# Patient Record
Sex: MALE | Race: WHITE | Employment: FULL TIME | URBAN - METROPOLITAN AREA
[De-identification: names, ages, dates, MRNs, and addresses within clinical notes are randomized per-mention and may not be internally consistent; named-entity substitution may affect disease eponyms.]

---

## 2019-03-24 ENCOUNTER — HOSPITAL ENCOUNTER (EMERGENCY)
Age: 29
Discharge: HOME | End: 2019-03-24
Attending: EMERGENCY MEDICINE
Payer: COMMERCIAL

## 2019-03-24 ENCOUNTER — APPOINTMENT (OUTPATIENT)
Dept: GENERAL RADIOLOGY | Age: 29
End: 2019-03-24
Payer: COMMERCIAL

## 2019-03-24 VITALS
OXYGEN SATURATION: 99 % | HEIGHT: 72 IN | TEMPERATURE: 97.2 F | WEIGHT: 265 LBS | HEART RATE: 88 BPM | SYSTOLIC BLOOD PRESSURE: 128 MMHG | RESPIRATION RATE: 12 BRPM | BODY MASS INDEX: 35.89 KG/M2 | DIASTOLIC BLOOD PRESSURE: 80 MMHG

## 2019-03-24 DIAGNOSIS — S61.210A LACERATION OF RIGHT INDEX FINGER WITHOUT FOREIGN BODY WITHOUT DAMAGE TO NAIL, INITIAL ENCOUNTER: Primary | ICD-10-CM

## 2019-03-24 PROCEDURE — 4500000023 HC ED LEVEL 3 PROCEDURE

## 2019-03-24 PROCEDURE — 90715 TDAP VACCINE 7 YRS/> IM: CPT | Performed by: NURSE PRACTITIONER

## 2019-03-24 PROCEDURE — 99283 EMERGENCY DEPT VISIT LOW MDM: CPT

## 2019-03-24 PROCEDURE — 6360000002 HC RX W HCPCS: Performed by: NURSE PRACTITIONER

## 2019-03-24 PROCEDURE — 90471 IMMUNIZATION ADMIN: CPT | Performed by: NURSE PRACTITIONER

## 2019-03-24 PROCEDURE — 73140 X-RAY EXAM OF FINGER(S): CPT

## 2019-03-24 RX ADMIN — TETANUS TOXOID, REDUCED DIPHTHERIA TOXOID AND ACELLULAR PERTUSSIS VACCINE, ADSORBED 0.5 ML: 5; 2.5; 8; 8; 2.5 SUSPENSION INTRAMUSCULAR at 08:55

## 2019-03-24 ASSESSMENT — PAIN DESCRIPTION - ORIENTATION: ORIENTATION: RIGHT

## 2019-03-24 ASSESSMENT — PAIN SCALES - GENERAL: PAINLEVEL_OUTOF10: 5

## 2019-03-24 ASSESSMENT — PAIN DESCRIPTION - PAIN TYPE: TYPE: ACUTE PAIN

## 2019-03-24 ASSESSMENT — PAIN DESCRIPTION - DESCRIPTORS: DESCRIPTORS: ACHING

## 2019-03-24 ASSESSMENT — PAIN DESCRIPTION - FREQUENCY: FREQUENCY: CONTINUOUS

## 2019-03-24 ASSESSMENT — PAIN DESCRIPTION - LOCATION: LOCATION: FINGER (COMMENT WHICH ONE)

## 2019-03-24 ASSESSMENT — PAIN DESCRIPTION - ONSET: ONSET: ON-GOING

## 2019-03-24 ASSESSMENT — PAIN DESCRIPTION - PROGRESSION: CLINICAL_PROGRESSION: NOT CHANGED

## 2022-01-01 ENCOUNTER — HOSPITAL ENCOUNTER (EMERGENCY)
Age: 32
End: 2022-04-21
Attending: STUDENT IN AN ORGANIZED HEALTH CARE EDUCATION/TRAINING PROGRAM
Payer: COMMERCIAL

## 2022-01-01 VITALS — RESPIRATION RATE: 9 BRPM | HEART RATE: 131 BPM

## 2022-01-01 DIAGNOSIS — I46.9 CARDIAC ARREST (HCC): Primary | ICD-10-CM

## 2022-01-01 LAB
A/G RATIO: 1.6 (ref 1.1–2.2)
ALBUMIN SERPL-MCNC: 3.9 G/DL (ref 3.4–5)
ALP BLD-CCNC: 76 U/L (ref 40–129)
ALT SERPL-CCNC: 82 U/L (ref 10–40)
ANION GAP SERPL CALCULATED.3IONS-SCNC: 22 MMOL/L (ref 3–16)
AST SERPL-CCNC: 74 U/L (ref 15–37)
ATYPICAL LYMPHOCYTE RELATIVE PERCENT: 3 % (ref 0–6)
BANDED NEUTROPHILS RELATIVE PERCENT: 4 % (ref 0–7)
BASOPHILS ABSOLUTE: 0.1 K/UL (ref 0–0.2)
BASOPHILS RELATIVE PERCENT: 1 %
BILIRUB SERPL-MCNC: <0.2 MG/DL (ref 0–1)
BUN BLDV-MCNC: 15 MG/DL (ref 7–20)
CALCIUM SERPL-MCNC: 8.7 MG/DL (ref 8.3–10.6)
CHLORIDE BLD-SCNC: 100 MMOL/L (ref 99–110)
CO2: 17 MMOL/L (ref 21–32)
CREAT SERPL-MCNC: 1.5 MG/DL (ref 0.9–1.3)
EOSINOPHILS ABSOLUTE: 0 K/UL (ref 0–0.6)
EOSINOPHILS RELATIVE PERCENT: 0 %
GFR AFRICAN AMERICAN: >60
GFR NON-AFRICAN AMERICAN: 54
GLUCOSE BLD-MCNC: 291 MG/DL (ref 70–99)
GLUCOSE BLD-MCNC: 310 MG/DL (ref 70–99)
HCT VFR BLD CALC: 45.9 % (ref 40.5–52.5)
HEMOGLOBIN: 14 G/DL (ref 13.5–17.5)
LYMPHOCYTES ABSOLUTE: 6.8 K/UL (ref 1–5.1)
LYMPHOCYTES RELATIVE PERCENT: 49 %
MAGNESIUM: 3 MG/DL (ref 1.8–2.4)
MCH RBC QN AUTO: 27.8 PG (ref 26–34)
MCHC RBC AUTO-ENTMCNC: 30.5 G/DL (ref 31–36)
MCV RBC AUTO: 91 FL (ref 80–100)
METAMYELOCYTES RELATIVE PERCENT: 1 %
MONOCYTES ABSOLUTE: 0.4 K/UL (ref 0–1.3)
MONOCYTES RELATIVE PERCENT: 3 %
NEUTROPHILS ABSOLUTE: 5.7 K/UL (ref 1.7–7.7)
NEUTROPHILS RELATIVE PERCENT: 39 %
PDW BLD-RTO: 15.3 % (ref 12.4–15.4)
PERFORMED ON: ABNORMAL
PLATELET # BLD: 197 K/UL (ref 135–450)
PLATELET SLIDE REVIEW: ADEQUATE
PMV BLD AUTO: 8.8 FL (ref 5–10.5)
POTASSIUM SERPL-SCNC: 5.2 MMOL/L (ref 3.5–5.1)
RBC # BLD: 5.05 M/UL (ref 4.2–5.9)
RBC # BLD: NORMAL 10*6/UL
SLIDE REVIEW: ABNORMAL
SODIUM BLD-SCNC: 139 MMOL/L (ref 136–145)
TOTAL PROTEIN: 6.3 G/DL (ref 6.4–8.2)
TROPONIN: <0.01 NG/ML
WBC # BLD: 13 K/UL (ref 4–11)

## 2022-01-01 PROCEDURE — 99285 EMERGENCY DEPT VISIT HI MDM: CPT

## 2022-01-01 PROCEDURE — 85025 COMPLETE CBC W/AUTO DIFF WBC: CPT

## 2022-01-01 PROCEDURE — 96376 TX/PRO/DX INJ SAME DRUG ADON: CPT

## 2022-01-01 PROCEDURE — 2500000003 HC RX 250 WO HCPCS

## 2022-01-01 PROCEDURE — 2500000003 HC RX 250 WO HCPCS: Performed by: STUDENT IN AN ORGANIZED HEALTH CARE EDUCATION/TRAINING PROGRAM

## 2022-01-01 PROCEDURE — 96374 THER/PROPH/DIAG INJ IV PUSH: CPT

## 2022-01-01 PROCEDURE — 31500 INSERT EMERGENCY AIRWAY: CPT

## 2022-01-01 PROCEDURE — 92950 HEART/LUNG RESUSCITATION CPR: CPT

## 2022-01-01 PROCEDURE — 96375 TX/PRO/DX INJ NEW DRUG ADDON: CPT

## 2022-01-01 PROCEDURE — 36415 COLL VENOUS BLD VENIPUNCTURE: CPT

## 2022-01-01 PROCEDURE — 80053 COMPREHEN METABOLIC PANEL: CPT

## 2022-01-01 PROCEDURE — 84484 ASSAY OF TROPONIN QUANT: CPT

## 2022-01-01 PROCEDURE — 6360000002 HC RX W HCPCS: Performed by: STUDENT IN AN ORGANIZED HEALTH CARE EDUCATION/TRAINING PROGRAM

## 2022-01-01 PROCEDURE — 6360000002 HC RX W HCPCS

## 2022-01-01 PROCEDURE — 2580000003 HC RX 258

## 2022-01-01 PROCEDURE — 83735 ASSAY OF MAGNESIUM: CPT

## 2022-01-01 RX ORDER — MAGNESIUM SULFATE HEPTAHYDRATE 500 MG/ML
INJECTION, SOLUTION INTRAMUSCULAR; INTRAVENOUS DAILY PRN
Status: COMPLETED | OUTPATIENT
Start: 2022-01-01 | End: 2022-01-01

## 2022-01-01 RX ORDER — NALOXONE HYDROCHLORIDE 0.4 MG/ML
INJECTION, SOLUTION INTRAMUSCULAR; INTRAVENOUS; SUBCUTANEOUS DAILY PRN
Status: COMPLETED | OUTPATIENT
Start: 2022-01-01 | End: 2022-01-01

## 2022-01-01 RX ORDER — EPINEPHRINE 0.1 MG/ML
SYRINGE (ML) INJECTION DAILY PRN
Status: COMPLETED | OUTPATIENT
Start: 2022-01-01 | End: 2022-01-01

## 2022-01-01 RX ORDER — CALCIUM CHLORIDE 100 MG/ML
INJECTION INTRAVENOUS; INTRAVENTRICULAR DAILY PRN
Status: COMPLETED | OUTPATIENT
Start: 2022-01-01 | End: 2022-01-01

## 2022-01-01 RX ORDER — DEXTROSE MONOHYDRATE 100 MG/ML
INJECTION, SOLUTION INTRAVENOUS
Status: DISCONTINUED
Start: 2022-01-01 | End: 2022-01-01 | Stop reason: HOSPADM

## 2022-01-01 RX ADMIN — EPINEPHRINE 1 MG: 0.1 INJECTION, SOLUTION ENDOTRACHEAL; INTRACARDIAC; INTRAVENOUS at 23:45

## 2022-01-01 RX ADMIN — CALCIUM CHLORIDE 1000 MG: 100 INJECTION, SOLUTION INTRAVENOUS; INTRAVENTRICULAR at 23:41

## 2022-01-01 RX ADMIN — EPINEPHRINE 1 MG: 0.1 INJECTION, SOLUTION ENDOTRACHEAL; INTRACARDIAC; INTRAVENOUS at 23:51

## 2022-01-01 RX ADMIN — SODIUM BICARBONATE 50 MEQ: 84 INJECTION, SOLUTION INTRAVENOUS at 23:52

## 2022-01-01 RX ADMIN — MAGNESIUM SULFATE HEPTAHYDRATE 2000 MG: 500 INJECTION, SOLUTION INTRAMUSCULAR; INTRAVENOUS at 23:58

## 2022-01-01 RX ADMIN — EPINEPHRINE 1 MG: 0.1 INJECTION, SOLUTION ENDOTRACHEAL; INTRACARDIAC; INTRAVENOUS at 23:43

## 2022-01-01 RX ADMIN — SODIUM BICARBONATE 50 MEQ: 84 INJECTION, SOLUTION INTRAVENOUS at 23:42

## 2022-01-01 RX ADMIN — EPINEPHRINE 1 MG: 0.1 INJECTION, SOLUTION ENDOTRACHEAL; INTRACARDIAC; INTRAVENOUS at 23:48

## 2022-01-01 RX ADMIN — NALOXONE HYDROCHLORIDE 2 MG: 0.4 INJECTION, SOLUTION INTRAMUSCULAR; INTRAVENOUS; SUBCUTANEOUS at 23:40

## 2022-01-01 RX ADMIN — EPINEPHRINE 1 MG: 0.1 INJECTION, SOLUTION ENDOTRACHEAL; INTRACARDIAC; INTRAVENOUS at 00:01

## 2022-01-01 RX ADMIN — CALCIUM CHLORIDE 1000 MG: 100 INJECTION, SOLUTION INTRAVENOUS; INTRAVENTRICULAR at 00:00

## 2022-01-01 RX ADMIN — NALOXONE HYDROCHLORIDE 2 MG: 0.4 INJECTION, SOLUTION INTRAMUSCULAR; INTRAVENOUS; SUBCUTANEOUS at 23:46

## 2022-04-21 NOTE — ED NOTES
0040-Patient's family counseled by Dr. Dustin Rocha. Patient's mom, dad and step dad at bedside with patient. Family informed that they could not touch the patient. Family understanding of the information. Madan Wilcox at bedside to talk to the patient's family.       Joanie Marroquin RN  04/21/22 5495

## 2022-04-21 NOTE — ED PROVIDER NOTES
[address] ED      CHIEF COMPLAINT  Cardiac arrest       HISTORY OF PRESENT ILLNESS  Lenka Champagne is a 27 y.o. male with a past medical history of anxiety and depression, who presents to the ED as a cardiac arrest.  History was limited at the time of presentation. Due to cardiac arrest, patient is not able to provide any history. EMS had called in initially to determine if we wanted the patient to be brought to the emergency department. Given that patient is only 27years old, I did recommend patient be brought in for further evaluation. EMS was called out at approximately 1045. They were told that patient had last been seen approximately 15 minutes before he was found unresponsive in the bathroom. .  No bystander CPR had been completed. On EMS arrival, patient was found to be in asystole and remained in asystole. They did feel that he had maybe been down for longer than 15 minutes based off his appearance. Patient arrives with i gel and IO in place. They report they gave 5 rounds of epinephrine in the field. No Narcan or other medications have been given. There was no admission of drug use, the patient had reportedly been drinking all day. After code had finished, further information had been overheard by nursing. Per family, patient had potentially struggled with drug abuse in the past.  Patient reportedly had collapsed in the kitchen and friends moved into the bathroom. It did not appear that patient had been complaining of anything earlier in the day. Patient was on Hydroxycut as a weight loss tool. Old records reviewed: No pertinent information noted. No other complaints, modifying factors or associated symptoms. I have reviewed the following from the nursing documentation. Past Medical History:   Diagnosis Date    Anxiety     Depression      No past surgical history on file. No family history on file.   Social History     Socioeconomic History    Marital status: Single     Spouse name: Not on file    Number of children: Not on file    Years of education: Not on file    Highest education level: Not on file   Occupational History    Not on file   Tobacco Use    Smoking status: Current Every Day Smoker     Packs/day: 0.25     Years: 5.00     Pack years: 1.25     Types: Cigarettes     Last attempt to quit: 2012     Years since quittin.8    Smokeless tobacco: Never Used   Substance and Sexual Activity    Alcohol use: Yes     Comment: weekends    Drug use: No    Sexual activity: Yes     Partners: Female   Other Topics Concern    Not on file   Social History Narrative    Not on file     Social Determinants of Health     Financial Resource Strain:     Difficulty of Paying Living Expenses: Not on file   Food Insecurity:     Worried About Running Out of Food in the Last Year: Not on file    Kay of Food in the Last Year: Not on file   Transportation Needs:     Lack of Transportation (Medical): Not on file    Lack of Transportation (Non-Medical):  Not on file   Physical Activity:     Days of Exercise per Week: Not on file    Minutes of Exercise per Session: Not on file   Stress:     Feeling of Stress : Not on file   Social Connections:     Frequency of Communication with Friends and Family: Not on file    Frequency of Social Gatherings with Friends and Family: Not on file    Attends Yazdanism Services: Not on file    Active Member of [address] [place] [address] or Organizations: Not on file    Attends Club or Organization Meetings: Not on file    Marital Status: Not on file   Intimate Partner Violence:     Fear of Current or Ex-Partner: Not on file    Emotionally Abused: Not on file    Physically Abused: Not on file    Sexually Abused: Not on file   Housing Stability:     Unable to Pay for Housing in the Last Year: Not on file    Number of [place] in the Last Year: Not on file    Unstable Housing in the Last Year: Not on file     No current facility-administered medications for this encounter. Current Outpatient Medications   Medication Sig Dispense Refill    naproxen (NAPROSYN) 500 MG tablet Take 1 tablet by mouth 2 times daily pain 14 tablet 0     Allergies   Allergen Reactions    Penicillins Hives     As infant       REVIEW OF SYSTEMS  Unable to assess due to cardiac arrest    PHYSICAL EXAM  Pulse 131   Resp 9    GENERAL APPEARANCE: Unresponsive. Ill-appearing. HENT: Normocephalic. Atraumatic. Mucous membranes are dry. I gel in place. NECK: Supple. EYES: Pupils fixed and dilated. Pupils equal, nonreactive to light  HEART/CHEST: Steven device in place  LUNGS: I gel in place, breath sounds bilaterally  ABDOMEN: Obese. . Soft. Non-distended. No masses. No organomegaly. MUSCULOSKELETAL: No extremity edema. Compartments soft. No deformity. SKIN: Cool and dry. No acute rashes.    NEUROLOGICAL: No apparent response in the setting of cardiac arrest  PSYCHIATRIC: Unable to assess due to cardiac arrest    LABS  Except for point-of-care glucose, all labs resulted after code completed  Results for orders placed or performed during the hospital encounter of 04/20/22   Comprehensive Metabolic Panel   Result Value Ref Range    Sodium 139 136 - 145 mmol/L    Potassium 5.2 (H) 3.5 - 5.1 mmol/L    Chloride 100 99 - 110 mmol/L    CO2 17 (L) 21 - 32 mmol/L    Anion Gap 22 (H) 3 - 16    Glucose 310 (H) 70 - 99 mg/dL    BUN 15 7 - 20 mg/dL    CREATININE 1.5 (H) 0.9 - 1.3 mg/dL    GFR Non-African American 54 (A) >60    GFR African American >60 >60    Calcium 8.7 8.3 - 10.6 mg/dL    Total Protein 6.3 (L) 6.4 - 8.2 g/dL    Albumin 3.9 3.4 - 5.0 g/dL    Albumin/Globulin Ratio 1.6 1.1 - 2.2    Total Bilirubin <0.2 0.0 - 1.0 mg/dL    Alkaline Phosphatase 76 40 - 129 U/L    ALT 82 (H) 10 - 40 U/L    AST 74 (H) 15 - 37 U/L   CBC with Auto Differential   Result Value Ref Range    WBC 13.0 (H) 4.0 - 11.0 K/uL    RBC 5.05 4.20 - 5.90 M/uL    Hemoglobin 14.0 13.5 - 17.5 g/dL    Hematocrit 45.9 40.5 - 52.5 %    MCV 91.0 80.0 - 100.0 fL    MCH 27.8 26.0 - 34.0 pg    MCHC 30.5 (L) 31.0 - 36.0 g/dL    RDW 15.3 12.4 - 15.4 %    Platelets 154 557 - 807 K/uL    MPV 8.8 5.0 - 10.5 fL    PLATELET SLIDE REVIEW Adequate     SLIDE REVIEW see below     Neutrophils % 39.0 %    Lymphocytes % 49.0 %    Monocytes % 3.0 %    Eosinophils % 0.0 %    Basophils % 1.0 %    Neutrophils Absolute 5.7 1.7 - 7.7 K/uL    Lymphocytes Absolute 6.8 (H) 1.0 - 5.1 K/uL    Monocytes Absolute 0.4 0.0 - 1.3 K/uL    Eosinophils Absolute 0.0 0.0 - 0.6 K/uL    Basophils Absolute 0.1 0.0 - 0.2 K/uL    Bands Relative 4 0 - 7 %    Atypical Lymphocytes Relative 3 0 - 6 %    Metamyelocytes Relative 1 (A) %    RBC Morphology Normal    Magnesium   Result Value Ref Range    Magnesium 3.00 (H) 1.80 - 2.40 mg/dL   Troponin   Result Value Ref Range    Troponin <0.01 <0.01 ng/mL   POCT Glucose   Result Value Ref Range    POC Glucose 291 (H) 70 - 99 mg/dl    Performed on ACCU-CHEK        PROCEDURES  Procedure Note - Intubation:  Emergent situation implied consent for the procedure. Intubation completed in the setting of cardiac arrest during active compressions. Appropriate equipment and staff were available at the bedside. Adebayo Schofield was not sedated/paralyzed. A glide scope blade was used for a full view and a 7.5mm endotracheal tube was viewed to pass through the cords on the first attempt. The tube was secured at ?cm to the lip. Tracheal position was confirmed using a colorimetric end-tidal CO2 detector, tube condensation and chest auscultation/visualization. Respiratory therapy is at the bedside and is assisting with ventilatory management. ED COURSE / MDM  Patient seen and evaluated. Old records reviewed and pertinent information included in HPI. Labs and imaging reviewed and results discussed with patient.       Overall ill appearing patient, presenting for cardiac arrest.  At the time of arrival, per EMS report, patient had been down over an hour. Physical exam remarkable for pupils fixed and dilated. Differential diagnosis includes but is not limited to: Cardiac arrest, hypoxia, acidosis, hyperglycemia, hyper/hypokalemia, hypothermia, hypovolemia, tension pneumothorax, cardiac tamponade, drug overdose, PE, ACS, intracranial hemorrhage    Patient arrived in cardiac arrest.  Patient had received epinephrine but no other medications in route. He had been reportedly in asystole the whole time. He arrived with i gel and IO in place. I gel was replaced with definitive airway on arrival.    Etiology of arrest is unclear at this time. Suspect there was potentially a longer downtime than reported. Furthermore, we later heard that patient had been moved from the kitchen to the bathroom which does call into question the story that was provided to EMS. Patient does not appear hypovolemic on arrival.  Patient is receiving fluids. I gel was in place on arrival with appropriate end-tidal CO2 per EMS. This was traded out for an endotracheal tube with good color change and bilateral breath sounds. End-tidal CO2 in the 50s. Low suspicion for acute hypoxia as a cause of inability to resuscitate    There is no history to support that patient would be acidotic. However, he did receive bicarb in the emergency department. Patient was not hypoglycemic, Accu-Chek showed glucose of 291. No history of renal disease or other acute abnormality. Patient did receive calcium and possible setting of hyperkalemia. Laboratory studies when they did result did show mild hyperkalemia to 5.2 though this was hemolyzed sample, overall low suspicion that hyperkalemia was the cause of arrest.    Patient was found in a house, overall low suspicion for hypothermia as the cause of arrest.    Patient had bilateral breath sounds, low suspicion for tension pneumothorax. Ultrasound not reveal evidence of cardiac tamponade.     Patient did receive multiple rounds of Narcan in the emergency department without any change. There is no evidence of DVT on exam, no history to support likely PE. Furthermore, patient was 27years old, overall low suspicion for myocardial infarction. Troponin was within normal limits. On initial resuscitation, patient appeared to be in asystole, there were some pulse checks were we saw PEA. After initial resuscitation, it was noted on the monitor the patient when the Doug Barbara device was going almost appeared to have torsades de pointes. However on pulse checks, patient remained in PEA. Patient did receive magnesium in the emergency department. Labs resulting after end of resuscitation revealed that magnesium level was actually elevated, this lab value was drawn prior to magnesium administration. By the time that magnesium had been administered, patient had already been down for approximately 1.5 hours. Creatinine was 1.5. Patient does not have any known history of renal disease. Unknown patient had kidney dysfunction prior to or as a result of cardiac arrest.  Patient had mild transaminitis. Mild leukocytosis to 13. No anemia or thrombocytopenia. Patient received multiple rounds of epinephrine in the emergency department. He also received calcium, bicarb, Narcan, and magnesium without reversal of cardiac arrest.  On final pulse check, patient had been down for approximately 1 hour and 40 minutes per EMS last known well. The code had been extended due to the patient's age, lack of history, and the questionable change in rhythm in the emergency department. Patient's pupils remain fixed and dilated, given concern that he had been down for greater than than an hour at the time of arrival to the emergency department, did feel that further treatment was futile. We had given full interventions without any improvement in patient's status.   Furthermore, the chance of any meaningful neurologic recovery after greater than 1.5 hours of cardiac arrest is extremely unlikely. On final pulse check, there was no pulse. No cardiac activity on ultrasound. PEA on the monitor. Spontaneous respirations. No heartbeat. Pupils fixed and dilated. Time of death was called at [id number] [address] on [address]. On family arrival, discussed code with family and alerted them to patient death. During the patient's ED course, the patient was given:  Medications   naloxone [facility]) injection (2 mg IntraVENous Given 22 2346)   calcium chloride 10 % injection (1,000 mg IntraVENous Given 22 0000)   sodium bicarbonate 8.4 % injection (50 mEq IntraVENous Given 22 2352)   EPINEPHrine 1 MG/10ML injection (1 mg IntraVENous Given 22 0001)   magnesium sulfate injection (2,000 mg IntraVENous Given 22 2358)      I spent a total of 31 minutes of critical care time in obtaining history, performing a physical exam, bedside monitoring of interventions, collecting and interpreting tests and discussion with consultants but not including time spent performing procedures. Clinical Concern cardiac arrest    Intervention Narcan, epinephrine, calcium, sodium bicarb, magnesium        CLINICAL IMPRESSION  1. Cardiac arrest ([address]    2. Patient         Pulse 131, resp. rate 9. [address] is . DISCLAIMER: This chart was created using Dragon dictation software. Efforts were made by me to ensure accuracy, however some errors may be present due to limitations of this technology and occasionally words are not transcribed correctly.           Qiana Naidu MD  22 3046

## 2022-04-21 NOTE — ED NOTES
Return call from . Information given and all questions answered. Patient will be taken by coroners office for an autopsy.       Jae Stephens RN  04/21/22 0035       Jae Stephens RN  04/21/22 0041

## 2022-04-21 NOTE — ED NOTES
at bedside. Paperwork provided. Patient placed in a body bag.  identification placed on body. Patient pulled over to the 's stretcher.  taken to lab by charge nurse to retrieve patient's blood specimens.       Juan Diego Wesley RN  04/21/22 8946